# Patient Record
Sex: FEMALE | URBAN - METROPOLITAN AREA
[De-identification: names, ages, dates, MRNs, and addresses within clinical notes are randomized per-mention and may not be internally consistent; named-entity substitution may affect disease eponyms.]

---

## 2022-12-08 ENCOUNTER — HOSPITAL ENCOUNTER (OUTPATIENT)
Dept: TELEMEDICINE | Facility: HOSPITAL | Age: 72
Discharge: HOME | End: 2022-12-08

## 2022-12-08 PROCEDURE — 99499 UNLISTED E&M SERVICE: CPT | Mod: GT,,, | Performed by: PSYCHIATRY & NEUROLOGY

## 2022-12-08 PROCEDURE — 99499 NO LOS: ICD-10-PCS | Mod: GT,,, | Performed by: PSYCHIATRY & NEUROLOGY

## 2022-12-08 NOTE — CARE UPDATE
Tele stroke attending on call:    Pt brought in due to new onset confusion, inability to communicate, and global weakness. She was last know well yesterday morning.  Has a PMH that is relevant for HTN, HLD, DM, brain tumor, and psych disorder.  On neuro exam she is awake but inattentive, does not follow commands, non verbal, no obvious face weakness, no gaze preference, globally weak but withdraws legs to painful stimuli.  CT head showed no acute abnormality.  D Dx encephalopathy,seizures,stroke.  Out of the window for treatment with iv alteplase.Don't suspect LVO.  Recommended admission, CTA brain and neck, and complete medical work up.  Will ultimately need MRI brain if medical work up negative.        .Eddi Monsivais MD   Vascular Neurology  [facility]  [facility]